# Patient Record
Sex: MALE | Race: BLACK OR AFRICAN AMERICAN | Employment: UNEMPLOYED | URBAN - NONMETROPOLITAN AREA
[De-identification: names, ages, dates, MRNs, and addresses within clinical notes are randomized per-mention and may not be internally consistent; named-entity substitution may affect disease eponyms.]

---

## 2021-02-28 ENCOUNTER — HOSPITAL ENCOUNTER (EMERGENCY)
Age: 21
Discharge: HOME | End: 2021-02-28
Payer: COMMERCIAL

## 2021-02-28 VITALS
SYSTOLIC BLOOD PRESSURE: 119 MMHG | TEMPERATURE: 98.6 F | OXYGEN SATURATION: 100 % | DIASTOLIC BLOOD PRESSURE: 70 MMHG | HEART RATE: 84 BPM | RESPIRATION RATE: 16 BRPM

## 2021-02-28 DIAGNOSIS — Z20.2 EXPOSURE TO CHLAMYDIA: Primary | ICD-10-CM

## 2021-02-28 PROCEDURE — 6370000000 HC RX 637 (ALT 250 FOR IP): Performed by: NURSE PRACTITIONER

## 2021-02-28 PROCEDURE — 96372 THER/PROPH/DIAG INJ SC/IM: CPT

## 2021-02-28 PROCEDURE — 87491 CHLMYD TRACH DNA AMP PROBE: CPT

## 2021-02-28 PROCEDURE — 99213 OFFICE O/P EST LOW 20 MIN: CPT

## 2021-02-28 PROCEDURE — 2500000003 HC RX 250 WO HCPCS: Performed by: NURSE PRACTITIONER

## 2021-02-28 PROCEDURE — 87591 N.GONORRHOEAE DNA AMP PROB: CPT

## 2021-02-28 PROCEDURE — 99212 OFFICE O/P EST SF 10 MIN: CPT | Performed by: NURSE PRACTITIONER

## 2021-02-28 PROCEDURE — 6360000002 HC RX W HCPCS: Performed by: NURSE PRACTITIONER

## 2021-02-28 RX ORDER — AZITHROMYCIN 250 MG/1
1000 TABLET, FILM COATED ORAL ONCE
Status: COMPLETED | OUTPATIENT
Start: 2021-02-28 | End: 2021-02-28

## 2021-02-28 RX ADMIN — LIDOCAINE HYDROCHLORIDE 500 MG: 10 INJECTION, SOLUTION EPIDURAL; INFILTRATION; INTRACAUDAL; PERINEURAL at 17:19

## 2021-02-28 RX ADMIN — AZITHROMYCIN MONOHYDRATE 1000 MG: 250 TABLET ORAL at 17:17

## 2021-02-28 ASSESSMENT — ENCOUNTER SYMPTOMS
NAUSEA: 0
VOMITING: 0

## 2021-02-28 NOTE — ED NOTES
Patient discharge instructions given to pt and pt verbalized understanding, no other needs at this time, and pt left in stable condition.      Lazarus Ruby, RN  02/28/21 5412

## 2021-02-28 NOTE — ED PROVIDER NOTES
[place] [id number]  Urgent Care Encounter       CHIEF COMPLAINT       Chief Complaint   Patient presents with    Exposure to STD     exposed to chlamydia        Nurses Notes reviewed and I agree except as noted in the HPI. HISTORY OF PRESENT ILLNESS   Isabel Snow is a 21 y.o. male who presents for evaluation after exposure to chlamydia. Patient had unprotected sex with a female partner 2 weeks ago. She can be notified to him 1 week ago that she was positive for chlamydia. He denies any penile discharge, dysuria, testicular or penile pain or any other symptoms. He has not had sexual intercourse with any other partner since that encounter. The history is provided by the patient. REVIEW OF SYSTEMS     Review of Systems   Constitutional: Negative for chills and fever. Gastrointestinal: Negative for nausea and vomiting. Genitourinary: Negative for difficulty urinating, discharge, dysuria, genital sores, penile pain, penile swelling, scrotal swelling and testicular pain. Skin: Negative for wound. Neurological: Negative for headaches. PAST MEDICAL HISTORY   History reviewed. No pertinent past medical history. SURGICALHISTORY     Patient  has a past surgical history that includes Appendectomy (2019). CURRENT MEDICATIONS       Previous Medications    No medications on file       ALLERGIES     Patient is has No Known Allergies. Patients   There is no immunization history on file for this patient. FAMILY HISTORY     Patient's family history is not on file. SOCIAL HISTORY     Patient  reports that he has never smoked. He has never used smokeless tobacco. He reports previous alcohol use. PHYSICAL EXAM     ED TRIAGE VITALS  BP: 127/74, Temp: 98.6 °F (37 °C), Pulse: 78, Resp: 16, SpO2: 100 %,There is no height or weight on file to calculate BMI.,No LMP for male patient. Physical Exam  Vitals signs and nursing note reviewed.    Constitutional:       General: He is not in acute distress. Appearance: He is well-developed. HENT:      Head: Normocephalic and atraumatic. Pulmonary:      Effort: Pulmonary effort is normal. No respiratory distress. Genitourinary:     Comments: Assessment deferred  Skin:     General: Skin is warm and dry. Neurological:      General: No focal deficit present. Mental Status: He is alert and oriented to person, place, and time. Psychiatric:         Mood and Affect: Mood normal.         Speech: Speech normal.         Behavior: Behavior normal. Behavior is cooperative. DIAGNOSTIC RESULTS     Labs:No results found for this visit on 02/28/21. IMAGING:    No orders to display         EKG:      URGENT CARE COURSE:     Vitals:    02/28/21 1703   BP: 127/74   Pulse: 78   Resp: 16   Temp: 98.6 °F (37 °C)   TempSrc: Temporal   SpO2: 100%       Medications   cefTRIAXone (ROCEPHIN) 500 mg in lidocaine 1 % 1.43 mL IM Injection (has no administration in time range)   azithromycin (ZITHROMAX) tablet 1,000 mg (1,000 mg Oral Given 2/28/21 1717)            PROCEDURES:  None    FINAL IMPRESSION      1. Exposure to chlamydia          DISPOSITION/ PLAN     Patient presents after exposure to chlamydia. He is asymptomatic. Patient was treated with Rocephin and azithromycin while in the urgent care center. No sex for 7days. Notify any partners if any. Advised to use condoms with sexual intercourse. Can follow-up with his family doctor or the [address] [place] [address] [place] for any further testing if desired. Further instructions were outlined verbally and in the patient's discharge instructions. All the patient's questions were answered. The patient/parent agreed with the plan and was discharged from the [facility] in good condition.       PATIENT REFERRED TO:  MD Davide Nance [address] [facility] [address]      DISCHARGE MEDICATIONS:  New Prescriptions    No medications on file       Discontinued Medications    No medications on file There are no discharge medications for this patient.       FE Cervantes CNP    (Please note that portions of this note were completed with a voice recognition program. Efforts were made to edit the dictations but occasionally words are mis-transcribed.)         FE Cervantes CNP  02/28/21 8360

## 2021-02-28 NOTE — ED TRIAGE NOTES
Patient walked to room 4 for exposure to chlamydia patient denies pain or discharge. No other needs.

## 2021-03-03 LAB
CHLAMYDIA TRACHOMATIS AMPLIFIED DET: NEGATIVE
NEISSERIA GONORRHOEAE BY AMP: NEGATIVE
SPECIMEN SOURCE: NORMAL

## 2021-10-10 ENCOUNTER — HOSPITAL ENCOUNTER (EMERGENCY)
Age: 21
Discharge: HOME | End: 2021-10-10
Payer: COMMERCIAL

## 2021-10-10 VITALS
RESPIRATION RATE: 16 BRPM | DIASTOLIC BLOOD PRESSURE: 80 MMHG | SYSTOLIC BLOOD PRESSURE: 126 MMHG | HEIGHT: 71 IN | TEMPERATURE: 97 F | BODY MASS INDEX: 26.18 KG/M2 | HEART RATE: 68 BPM | WEIGHT: 187 LBS | OXYGEN SATURATION: 99 %

## 2021-10-10 DIAGNOSIS — Z11.3 SCREEN FOR STD (SEXUALLY TRANSMITTED DISEASE): Primary | ICD-10-CM

## 2021-10-10 DIAGNOSIS — R30.0 DYSURIA: ICD-10-CM

## 2021-10-10 DIAGNOSIS — N30.00 ACUTE CYSTITIS WITHOUT HEMATURIA: ICD-10-CM

## 2021-10-10 LAB
BILIRUBIN URINE: NEGATIVE
BLOOD, URINE: NEGATIVE
CHARACTER, URINE: CLEAR
CHLAMYDIA TRACHOMATIS BY RT-PCR: DETECTED
COLOR: YELLOW
CT/NG SOURCE: ABNORMAL
GLUCOSE URINE: NEGATIVE MG/DL
KETONES, URINE: NEGATIVE
LEUKOCYTE ESTERASE, URINE: ABNORMAL
NEISSERIA GONORRHOEAE BY RT-PCR: DETECTED
NITRITE, URINE: NEGATIVE
PH UA: 8.5 (ref 5–9)
PROTEIN UA: NEGATIVE MG/DL
SPECIFIC GRAVITY UA: 1.02 (ref 1–1.03)
UROBILINOGEN, URINE: 0.2 EU/DL (ref 0.2–1)

## 2021-10-10 PROCEDURE — 87086 URINE CULTURE/COLONY COUNT: CPT

## 2021-10-10 PROCEDURE — 87491 CHLMYD TRACH DNA AMP PROBE: CPT

## 2021-10-10 PROCEDURE — 99213 OFFICE O/P EST LOW 20 MIN: CPT

## 2021-10-10 PROCEDURE — 87591 N.GONORRHOEAE DNA AMP PROB: CPT

## 2021-10-10 PROCEDURE — 99213 OFFICE O/P EST LOW 20 MIN: CPT | Performed by: NURSE PRACTITIONER

## 2021-10-10 PROCEDURE — 6360000002 HC RX W HCPCS: Performed by: NURSE PRACTITIONER

## 2021-10-10 PROCEDURE — 81003 URINALYSIS AUTO W/O SCOPE: CPT

## 2021-10-10 PROCEDURE — 2500000003 HC RX 250 WO HCPCS: Performed by: NURSE PRACTITIONER

## 2021-10-10 PROCEDURE — 6370000000 HC RX 637 (ALT 250 FOR IP): Performed by: NURSE PRACTITIONER

## 2021-10-10 PROCEDURE — 96372 THER/PROPH/DIAG INJ SC/IM: CPT

## 2021-10-10 RX ORDER — AZITHROMYCIN 250 MG/1
1000 TABLET, FILM COATED ORAL ONCE
Status: COMPLETED | OUTPATIENT
Start: 2021-10-10 | End: 2021-10-10

## 2021-10-10 RX ADMIN — LIDOCAINE HYDROCHLORIDE 500 MG: 10 INJECTION, SOLUTION EPIDURAL; INFILTRATION; INTRACAUDAL; PERINEURAL at 17:14

## 2021-10-10 RX ADMIN — AZITHROMYCIN 1000 MG: 250 TABLET, FILM COATED ORAL at 17:13

## 2021-10-10 ASSESSMENT — ENCOUNTER SYMPTOMS
NAUSEA: 0
STRIDOR: 0
SHORTNESS OF BREATH: 0
VOMITING: 0
CHEST TIGHTNESS: 0
WHEEZING: 0
CHOKING: 0
COUGH: 0
APNEA: 0

## 2021-10-10 NOTE — ED NOTES
Injection site within normal limits. Denies adverse reaction to injection. VS WNL. Discussed discharge instructions and voiced understanding.      Chan Rushing RN  10/10/21 7202

## 2021-10-10 NOTE — ED TRIAGE NOTES
Pt c/o dysuria x 1.5 weeks. Denies penile discharge. No known exposure to STD but does state that he has unproductive sex with 1 partner.

## 2021-10-10 NOTE — ED PROVIDER NOTES
[place] [id number]  Urgent Care Encounter      CHIEF COMPLAINT       Chief Complaint   Patient presents with    Dysuria     possible STD exposure       Nurses Notes reviewed and I agree except as noted in the HPI. HISTORY OFPRESENT ILLNESS   Howard Jackson is a 24 y.o. The history is provided by the patient. No  was used. Dysuria   This is a new problem. The current episode started more than 1 week ago. The problem occurs every urination. The quality of the pain is described as burning. The pain is moderate. There has been no fever. He is sexually active. There is no history of pyelonephritis. Pertinent negatives include no chills, no sweats, no nausea, no vomiting, no discharge, no frequency, no hematuria, no hesitancy, no possible pregnancy, no urgency and no flank pain. He has tried increased fluids for the symptoms. His past medical history does not include kidney stones, single kidney, urological procedure, recurrent UTIs, urinary stasis or catheterization. REVIEW OF SYSTEMS     Review of Systems   Constitutional: Negative for activity change, appetite change, chills, diaphoresis, fatigue, fever and unexpected weight change. Respiratory: Negative for apnea, cough, choking, chest tightness, shortness of breath, wheezing and stridor. Cardiovascular: Negative for chest pain, palpitations and leg swelling. Gastrointestinal: Negative for nausea and vomiting. Genitourinary: Positive for dysuria. Negative for discharge, flank pain, frequency, genital sores, hematuria, hesitancy, penile pain, penile swelling, scrotal swelling, testicular pain and urgency. PAST MEDICAL HISTORY   History reviewed. No pertinent past medical history. SURGICAL HISTORY     Patient  has a past surgical history that includes Appendectomy (2019).     CURRENT MEDICATIONS       Previous Medications    No medications on file       ALLERGIES     Patient is has No Known Allergies. FAMILY HISTORY     Patient's family history is not on file. SOCIAL HISTORY     Patient  reports that he has never smoked. He has never used smokeless tobacco. He reports current alcohol use. He reports that he does not use drugs. PHYSICAL EXAM     ED TRIAGE VITALS  BP: 137/84, Temp: 97 °F (36.1 °C), Pulse: 70, Resp: 16, SpO2: 99 %  Physical Exam  Vitals and nursing note reviewed. Constitutional:       General: He is not in acute distress. Appearance: Normal appearance. He is normal weight. He is not ill-appearing, toxic-appearing or diaphoretic. HENT:      Head: Normocephalic and atraumatic. Right Ear: External ear normal.      Left Ear: External ear normal.   Eyes:      Extraocular Movements: Extraocular movements intact. Conjunctiva/sclera: Conjunctivae normal.   Pulmonary:      Effort: Pulmonary effort is normal.   Musculoskeletal:         General: Normal range of motion. Cervical back: Normal range of motion. Skin:     General: Skin is warm. Neurological:      General: No focal deficit present. Mental Status: He is alert and oriented to person, place, and time. Psychiatric:         Mood and Affect: Mood normal.         Behavior: Behavior normal.         Thought Content:  Thought content normal.         Judgment: Judgment normal.         DIAGNOSTIC RESULTS   Labs:  Results for orders placed or performed during the hospital encounter of 10/10/21   Urinalysis   Result Value Ref Range    Glucose, Ur Negative NEGATIVE mg/dl    Bilirubin Urine Negative NEGATIVE    Ketones, Urine Negative NEGATIVE    Specific Gravity, UA 1.020 1.002 - 1.030    Blood, Urine Negative NEGATIVE    pH, UA 8.50 5.0 - 9.0    Protein, UA Negative NEGATIVE mg/dl    Urobilinogen, Urine 0.20 0.2 - 1.0 eu/dl    Nitrite, Urine Negative NEGATIVE    Leukocyte Esterase, Urine Trace (A) NEGATIVE    Color, UA Yellow STRAW-YELLOW    Character, Urine Clear CLEAR-SL CLOUD       IMAGING:  No orders to display     URGENT CARE COURSE:     Vitals:    10/10/21 1635   BP: 137/84   Pulse: 70   Resp: 16   Temp: 97 °F (36.1 °C)   SpO2: 99%   Weight: 187 lb (84.8 kg)   Height: 5' 11\" (1.803 m)       Medications   azithromycin (ZITHROMAX) tablet 1,000 mg (1,000 mg Oral Given 10/10/21 1713)   cefTRIAXone (ROCEPHIN) 500 mg in lidocaine 1 % 1 mL IM Injection (500 mg IntraMUSCular Given 10/10/21 1714)     PROCEDURES:  None  FINAL IMPRESSION      1. Screen for STD (sexually transmitted disease)    2. Dysuria    3. Acute cystitis without hematuria        DISPOSITION/PLAN   Decision To Discharge       No sex x 7 days  Use protection  Notify partners  Monitor for any changes such as rash, lesions, or urinary issues  Follow up with PCP x 1 week  Go to ED for any changes  For Culture Results  Call in 3-5 days after your visit. To obtain results. FOR MORE INFORMATION ABOUT STDS, CONTACT YOUR PHYSICIAN OR:    [address]  Department  Sexually Transmitted Disease [facility]     Address: [place] [facility], [facility] [address] [employer] [address], [address]   [phone]  Regarding syphilis and HIV testing as per [facility] guidelines  [address] [place] [address]     [phone]                PATIENT REFERRED TO:  [address] [place] [id number]  Call today  [address] Department  [address] [employer] [employer] [place] [place] [address] [facility]      Tuesday mornings for additional testing    DISCHARGE MEDICATIONS:  New Prescriptions    No medications on file     There are no discharge medications for this patient.       FE Sylvester CNP, APRN - CNP  10/10/21 1726

## 2021-10-12 LAB — URINE CULTURE, ROUTINE: NORMAL

## 2021-12-28 ENCOUNTER — HOSPITAL ENCOUNTER (EMERGENCY)
Age: 21
Discharge: HOME | End: 2021-12-28
Payer: COMMERCIAL

## 2021-12-28 VITALS
SYSTOLIC BLOOD PRESSURE: 133 MMHG | DIASTOLIC BLOOD PRESSURE: 78 MMHG | BODY MASS INDEX: 25.8 KG/M2 | HEART RATE: 73 BPM | TEMPERATURE: 97 F | OXYGEN SATURATION: 99 % | WEIGHT: 185 LBS | RESPIRATION RATE: 16 BRPM

## 2021-12-28 DIAGNOSIS — N34.2 URETHRITIS: Primary | ICD-10-CM

## 2021-12-28 LAB
BILIRUBIN URINE: NEGATIVE
BLOOD, URINE: NEGATIVE
CHARACTER, URINE: CLEAR
COLOR: YELLOW
GLUCOSE URINE: NEGATIVE MG/DL
KETONES, URINE: NEGATIVE
LEUKOCYTE ESTERASE, URINE: NEGATIVE
NITRITE, URINE: NEGATIVE
PH UA: 5.5 (ref 5–9)
PROTEIN UA: NEGATIVE MG/DL
SPECIFIC GRAVITY UA: >= 1.03 (ref 1–1.03)
UROBILINOGEN, URINE: 0.2 EU/DL (ref 0.2–1)

## 2021-12-28 PROCEDURE — 96372 THER/PROPH/DIAG INJ SC/IM: CPT

## 2021-12-28 PROCEDURE — 6370000000 HC RX 637 (ALT 250 FOR IP): Performed by: EMERGENCY MEDICINE

## 2021-12-28 PROCEDURE — 2500000003 HC RX 250 WO HCPCS: Performed by: EMERGENCY MEDICINE

## 2021-12-28 PROCEDURE — 81003 URINALYSIS AUTO W/O SCOPE: CPT

## 2021-12-28 PROCEDURE — 99213 OFFICE O/P EST LOW 20 MIN: CPT

## 2021-12-28 PROCEDURE — 87591 N.GONORRHOEAE DNA AMP PROB: CPT

## 2021-12-28 PROCEDURE — 6360000002 HC RX W HCPCS: Performed by: EMERGENCY MEDICINE

## 2021-12-28 PROCEDURE — 99213 OFFICE O/P EST LOW 20 MIN: CPT | Performed by: EMERGENCY MEDICINE

## 2021-12-28 PROCEDURE — 87491 CHLMYD TRACH DNA AMP PROBE: CPT

## 2021-12-28 RX ORDER — AZITHROMYCIN 250 MG/1
1000 TABLET, FILM COATED ORAL ONCE
Status: COMPLETED | OUTPATIENT
Start: 2021-12-28 | End: 2021-12-28

## 2021-12-28 RX ADMIN — AZITHROMYCIN MONOHYDRATE 1000 MG: 250 TABLET ORAL at 12:48

## 2021-12-28 RX ADMIN — LIDOCAINE HYDROCHLORIDE 500 MG: 10 INJECTION, SOLUTION EPIDURAL; INFILTRATION; INTRACAUDAL; PERINEURAL at 12:49

## 2021-12-28 ASSESSMENT — ENCOUNTER SYMPTOMS
ABDOMINAL PAIN: 0
COUGH: 0

## 2021-12-28 NOTE — ED PROVIDER NOTES
[place]  Urgent Care Encounter       CHIEF COMPLAINT       Chief Complaint   Patient presents with    Sexually Transmitted Diseases     tingling with urination       Nurses Notes reviewed and I agree except as noted in the HPI. HISTORY OF PRESENT ILLNESS   Cathy Chapman is a 24 y.o. male who presents for burning when he urinates. Patient denies any penile discharge. No swelling. No scrotal or testicular swelling. No testicular pain. Patient states that he had sex with a new partner. Last time he had sex with her was 2 weeks ago. Symptoms began 4 to 5 days ago. Denies abdominal pain, no nausea, vomiting, diarrhea. No fevers. HPI    REVIEW OF SYSTEMS     Review of Systems   Constitutional: Negative for fatigue and fever. Respiratory: Negative for cough. Gastrointestinal: Negative for abdominal pain. Genitourinary: Positive for dysuria. Negative for frequency, genital sores, penile discharge, penile pain, penile swelling and testicular pain. Neurological: Negative for dizziness and headaches. Psychiatric/Behavioral: Negative for behavioral problems. PAST MEDICAL HISTORY   History reviewed. No pertinent past medical history. SURGICALHISTORY     Patient  has a past surgical history that includes Appendectomy (2019). CURRENT MEDICATIONS       Previous Medications    DIGESTIVE ENZYMES ([employer] PREBIOTIC SUPPLEMENT PO)    Take by mouth       ALLERGIES     Patient is has No Known Allergies. Patients   There is no immunization history on file for this patient. FAMILY HISTORY     Patient's family history includes High Blood Pressure in his father and mother. SOCIAL HISTORY     Patient  reports that he has been smoking. He has never used smokeless tobacco. He reports current alcohol use. He reports that he does not use drugs.     PHYSICAL EXAM     ED TRIAGE VITALS  BP: 133/78, Temp: 97 °F (36.1 °C), Pulse: 73, Resp: 16, SpO2: 99 %,Estimated body mass index is 25.8 kg/m² as calculated from the following:    Height as of 10/10/21: 5' 11\" (1.803 m). Weight as of this encounter: 185 lb (83.9 kg). ,No LMP for male patient. Physical Exam  Constitutional:       General: He is not in acute distress. Appearance: He is normal weight. He is not ill-appearing. HENT:      Head: Normocephalic. Mouth/Throat:      Mouth: Mucous membranes are moist.   Cardiovascular:      Rate and Rhythm: Normal rate and regular rhythm. Pulses: Normal pulses. Heart sounds: Normal heart sounds. Pulmonary:      Effort: Pulmonary effort is normal.      Breath sounds: Normal breath sounds. Skin:     General: Skin is warm and dry. Capillary Refill: Capillary refill takes less than 2 seconds. Neurological:      General: No focal deficit present. Mental Status: He is alert.    Psychiatric:         Behavior: Behavior normal.         DIAGNOSTIC RESULTS     Labs:  Results for orders placed or performed during the hospital encounter of 12/28/21   Urinalysis   Result Value Ref Range    Glucose, Ur Negative NEGATIVE mg/dl    Bilirubin Urine Negative NEGATIVE    Ketones, Urine Negative NEGATIVE    Specific Gravity, UA >=1.030 1.002 - 1.030    Blood, Urine Negative NEGATIVE    pH, UA 5.50 5.0 - 9.0    Protein, UA Negative NEGATIVE mg/dl    Urobilinogen, Urine 0.20 0.2 - 1.0 eu/dl    Nitrite, Urine Negative NEGATIVE    Leukocyte Esterase, Urine Negative NEGATIVE    Color, UA Yellow STRAW-YELLOW    Character, Urine Clear CLEAR-SL CLOUD       IMAGING:    No orders to display         EKG:      URGENT CARE COURSE:     Vitals:    12/28/21 1228   BP: 133/78   Pulse: 73   Resp: 16   Temp: 97 °F (36.1 °C)   TempSrc: Temporal   SpO2: 99%   Weight: 185 lb (83.9 kg)       Medications   cefTRIAXone (ROCEPHIN) 500 mg in lidocaine 1 % 1 mL IM Injection (500 mg IntraMUSCular Given 12/28/21 1249)   azithromycin (ZITHROMAX) tablet 1,000 mg (1,000 mg Oral Given 12/28/21 1248) PROCEDURES:  None    FINAL IMPRESSION      1. Urethritis          DISPOSITION/ PLAN     Patient presents for what is likely urethritis. STD testing in process. Patient is treated with Rocephin and a azithromycin. He is advised to avoid sex x1 week. Results available tomorrow, access [employer]. Notify any partners if positive. Return for new or worsening symptoms.       PATIENT REFERRED TO:  Josee Dumont MD  [address] [facility]  [address] / [place] [address]      DISCHARGE MEDICATIONS:  New Prescriptions    No medications on file       Discontinued Medications    No medications on file       Current Discharge Medication List          FE Orr - CNP    (Please note that portions of this note were completed with a voice recognition program. Efforts were made to edit the dictations but occasionally words are mis-transcribed.)          FE Orr CNP  12/28/21 6389

## 2022-01-01 LAB
CHLAMYDIA TRACHOMATIS AMPLIFIED DET: POSITIVE
NEISSERIA GONORRHOEAE BY AMP: NEGATIVE
SPECIMEN SOURCE: ABNORMAL

## 2022-06-28 ENCOUNTER — HOSPITAL ENCOUNTER (EMERGENCY)
Age: 22
Discharge: HOME | End: 2022-06-28
Payer: COMMERCIAL

## 2022-06-28 VITALS
TEMPERATURE: 98.2 F | SYSTOLIC BLOOD PRESSURE: 125 MMHG | WEIGHT: 180 LBS | BODY MASS INDEX: 25.2 KG/M2 | HEIGHT: 71 IN | HEART RATE: 90 BPM | DIASTOLIC BLOOD PRESSURE: 77 MMHG | RESPIRATION RATE: 14 BRPM | OXYGEN SATURATION: 99 %

## 2022-06-28 DIAGNOSIS — Z20.2 EXPOSURE TO CHLAMYDIA: ICD-10-CM

## 2022-06-28 DIAGNOSIS — Z11.3 SCREENING EXAMINATION FOR STD (SEXUALLY TRANSMITTED DISEASE): Primary | ICD-10-CM

## 2022-06-28 PROCEDURE — 96372 THER/PROPH/DIAG INJ SC/IM: CPT

## 2022-06-28 PROCEDURE — 87591 N.GONORRHOEAE DNA AMP PROB: CPT

## 2022-06-28 PROCEDURE — 6370000000 HC RX 637 (ALT 250 FOR IP): Performed by: NURSE PRACTITIONER

## 2022-06-28 PROCEDURE — 99213 OFFICE O/P EST LOW 20 MIN: CPT | Performed by: NURSE PRACTITIONER

## 2022-06-28 PROCEDURE — 87491 CHLMYD TRACH DNA AMP PROBE: CPT

## 2022-06-28 PROCEDURE — 2500000003 HC RX 250 WO HCPCS: Performed by: NURSE PRACTITIONER

## 2022-06-28 PROCEDURE — 6360000002 HC RX W HCPCS: Performed by: NURSE PRACTITIONER

## 2022-06-28 PROCEDURE — 99213 OFFICE O/P EST LOW 20 MIN: CPT

## 2022-06-28 RX ORDER — AZITHROMYCIN 250 MG/1
1000 TABLET, FILM COATED ORAL ONCE
Status: COMPLETED | OUTPATIENT
Start: 2022-06-28 | End: 2022-06-28

## 2022-06-28 RX ORDER — METRONIDAZOLE 500 MG/1
2000 TABLET ORAL ONCE
Status: COMPLETED | OUTPATIENT
Start: 2022-06-28 | End: 2022-06-28

## 2022-06-28 RX ADMIN — LIDOCAINE HYDROCHLORIDE 500 MG: 10 INJECTION, SOLUTION EPIDURAL; INFILTRATION; INTRACAUDAL; PERINEURAL at 19:28

## 2022-06-28 RX ADMIN — AZITHROMYCIN MONOHYDRATE 1000 MG: 250 TABLET ORAL at 19:27

## 2022-06-28 RX ADMIN — METRONIDAZOLE 2000 MG: 500 TABLET ORAL at 19:28

## 2022-06-28 ASSESSMENT — ENCOUNTER SYMPTOMS
COUGH: 0
DIARRHEA: 0
ABDOMINAL PAIN: 0
EYE ITCHING: 0
NAUSEA: 0
EYE REDNESS: 0
SHORTNESS OF BREATH: 0
VOMITING: 0

## 2022-06-28 ASSESSMENT — PAIN - FUNCTIONAL ASSESSMENT: PAIN_FUNCTIONAL_ASSESSMENT: NONE - DENIES PAIN

## 2022-06-28 NOTE — ED PROVIDER NOTES
[address]       Chief Complaint   Patient presents with    Exposure to STD     exposed to chlamydia  had burning with urination 6/24/22       Nurses Notes reviewed and I agree except as noted in the HPI. HISTORY OF PRESENT ILLNESS   Chucho Mercer is a 24 y.o. male who presents for evaluation and STD screening due to exposure to chlamydia. Has burning with urination that started 6/24/22. He reports  He reports a  history of chlamydia. The patient/patient representative has no other acute complaints at this time. REVIEW OF SYSTEMS     Review of Systems   Constitutional: Negative for chills, fatigue and fever. Eyes: Negative for redness and itching. Respiratory: Negative for cough and shortness of breath. Cardiovascular: Negative for chest pain. Gastrointestinal: Negative for abdominal pain, diarrhea, nausea and vomiting. Genitourinary: Positive for dysuria. Negative for frequency, genital sores, hematuria, penile discharge and urgency. Skin: Negative for rash. Allergic/Immunologic: Negative for environmental allergies and food allergies. PAST MEDICAL HISTORY   History reviewed. No pertinent past medical history. SURGICAL HISTORY     Patient  has a past surgical history that includes Appendectomy (2019). CURRENT MEDICATIONS       Previous Medications    DIGESTIVE ENZYMES ([employer] PREBIOTIC SUPPLEMENT PO)    Take by mouth       ALLERGIES     Patient is has No Known Allergies. FAMILY HISTORY     Patient'sfamily history includes High Blood Pressure in his father and mother; Lupus in his mother. SOCIAL HISTORY     Patient  reports that he has been smoking e-cigarettes. He has never used smokeless tobacco. He reports current alcohol use. He reports that he does not use drugs.     PHYSICAL EXAM     ED TRIAGE VITALS  BP: (!) 151/86, Temp: 98.2 °F (36.8 °C), Heart Rate: 88, Resp: 12, SpO2: 99 %  Physical Exam  Vitals and nursing note reviewed. Constitutional:       General: He is not in acute distress. Appearance: Normal appearance. He is well-developed and well-groomed. HENT:      Head: Normocephalic and atraumatic. Right Ear: External ear normal.      Left Ear: External ear normal.   Eyes:      Conjunctiva/sclera:      Right eye: Right conjunctiva is not injected. Left eye: Left conjunctiva is not injected. Pupils: Pupils are equal.   Cardiovascular:      Rate and Rhythm: Normal rate. Pulmonary:      Effort: Pulmonary effort is normal. No respiratory distress. Musculoskeletal:      Cervical back: Normal range of motion. Skin:     General: Skin is warm and dry. Findings: No rash. Neurological:      Mental Status: He is alert and oriented to person, place, and time. Gait: Gait is intact. Psychiatric:         Mood and Affect: Mood normal.         Speech: Speech normal.         Behavior: Behavior is cooperative. DIAGNOSTIC RESULTS   Labs:  Abnormal Labs Reviewed - No abnormal labs to display     IMAGING:  No orders to display     URGENT CARE COURSE:     Vitals:    06/28/22 1916   BP: (!) 151/86   Pulse: 88   Resp: 12   Temp: 98.2 °F (36.8 °C)   SpO2: 99%   Weight: 180 lb (81.6 kg)   Height: 5' 11\" (1.803 m)       Medications   cefTRIAXone (ROCEPHIN) 500 mg in lidocaine 1 % 1.4286 mL IM Injection (500 mg IntraMUSCular Given 6/28/22 1928)   azithromycin (ZITHROMAX) tablet 1,000 mg (1,000 mg Oral Given 6/28/22 1927)   metroNIDAZOLE (FLAGYL) tablet 2,000 mg (2,000 mg Oral Given 6/28/22 1928)     PROCEDURES:  FINALIMPRESSION      1. Screening examination for STD (sexually transmitted disease)    2.  Exposure to chlamydia        DISPOSITION/PLAN   DISPOSITION    Discharge        Problem List Items Addressed This Visit     None      Visit Diagnoses     Screening examination for STD (sexually transmitted disease)    -  Primary    Relevant Medications    cefTRIAXone (ROCEPHIN) 500 mg in lidocaine 1 % 1.4286 mL IM Injection (Completed) (Start on 6/28/2022  7:45 PM)    azithromycin (ZITHROMAX) tablet 1,000 mg (Completed) (Start on 6/28/2022  7:45 PM)    metroNIDAZOLE (FLAGYL) tablet 2,000 mg (Completed) (Start on 6/28/2022  7:45 PM)    Exposure to chlamydia              Physical assessment findings, diagnostic testing(s) if applicable, and vital signs reviewed with patient/patient representative. Differential diagnosis(s) discussed with patient/patient representative. Prescription medications and/or over-the-counter medications for symptom management discussed. Patient is to follow-up with family care provider in 2-3 days if no improvement. If symptoms should worsen or change, go to the ED. Patient/patient representative is aware of care plan, questions answered, verbalizes understanding and is in agreement. Printed instructions attached to after visit summary. If COVID-19 positive or COVID-19 by PCR is pending at time of discharge patient is to quarantine/isolate according to [facility] guidelines. PATIENT REFERRED TO:  Be Lee MD  [address] [facility]   [address]  [place] [id number] [place] [address]    Schedule an appointment as soon as possible for a visit in 3 days  For further evaluation. , If symptoms change/worsen, go to the [address] [place] [address] [address]    Schedule an appointment as soon as possible for a visit   For further evaluation in STD clinic      Susana Lefort, APRN - CNP    Please note that some or all of this chart was generated using [employer] voice recognition software. Although every effort was made to ensure the accuracy of this automated transcription, some errors in transcription may have occurred.         Susana Lefort, APRN - CNP  06/28/22 1890

## 2022-06-29 LAB
CHLAMYDIA TRACHOMATIS BY RT-PCR: DETECTED
CT/NG SOURCE: ABNORMAL
NEISSERIA GONORRHOEAE BY RT-PCR: NOT DETECTED

## 2022-09-19 ENCOUNTER — HOSPITAL ENCOUNTER (EMERGENCY)
Age: 22
Discharge: HOME | End: 2022-09-19
Payer: COMMERCIAL

## 2022-09-19 VITALS
RESPIRATION RATE: 18 BRPM | BODY MASS INDEX: 26.18 KG/M2 | HEART RATE: 64 BPM | HEIGHT: 71 IN | OXYGEN SATURATION: 99 % | DIASTOLIC BLOOD PRESSURE: 69 MMHG | TEMPERATURE: 98.1 F | SYSTOLIC BLOOD PRESSURE: 131 MMHG | WEIGHT: 187 LBS

## 2022-09-19 DIAGNOSIS — Z20.2 EXPOSURE TO SEXUALLY TRANSMITTED DISEASE (STD): Primary | ICD-10-CM

## 2022-09-19 LAB
CHLAMYDIA TRACHOMATIS BY RT-PCR: NOT DETECTED
CT/NG SOURCE: NORMAL
NEISSERIA GONORRHOEAE BY RT-PCR: NOT DETECTED

## 2022-09-19 PROCEDURE — 96372 THER/PROPH/DIAG INJ SC/IM: CPT

## 2022-09-19 PROCEDURE — 87591 N.GONORRHOEAE DNA AMP PROB: CPT

## 2022-09-19 PROCEDURE — 2500000003 HC RX 250 WO HCPCS: Performed by: PHYSICIAN ASSISTANT

## 2022-09-19 PROCEDURE — 87491 CHLMYD TRACH DNA AMP PROBE: CPT

## 2022-09-19 PROCEDURE — 6370000000 HC RX 637 (ALT 250 FOR IP): Performed by: PHYSICIAN ASSISTANT

## 2022-09-19 PROCEDURE — 6360000002 HC RX W HCPCS: Performed by: PHYSICIAN ASSISTANT

## 2022-09-19 PROCEDURE — 99284 EMERGENCY DEPT VISIT MOD MDM: CPT

## 2022-09-19 RX ORDER — AZITHROMYCIN 250 MG/1
1000 TABLET, FILM COATED ORAL ONCE
Status: COMPLETED | OUTPATIENT
Start: 2022-09-19 | End: 2022-09-19

## 2022-09-19 RX ADMIN — AZITHROMYCIN MONOHYDRATE 1000 MG: 250 TABLET ORAL at 16:08

## 2022-09-19 RX ADMIN — LIDOCAINE HYDROCHLORIDE 500 MG: 10 INJECTION, SOLUTION EPIDURAL; INFILTRATION; INTRACAUDAL; PERINEURAL at 16:08

## 2022-09-19 ASSESSMENT — ENCOUNTER SYMPTOMS
VOMITING: 0
BACK PAIN: 0
ABDOMINAL PAIN: 0
COUGH: 0
NAUSEA: 0
SORE THROAT: 0

## 2022-09-19 ASSESSMENT — PAIN - FUNCTIONAL ASSESSMENT: PAIN_FUNCTIONAL_ASSESSMENT: NONE - DENIES PAIN

## 2022-09-19 NOTE — ED PROVIDER NOTES
[facility] EMERGENCY DEPT      CHIEF COMPLAINT       Chief Complaint   Patient presents with    Exposure to STD       Nurses Notes reviewed and I agree except as noted in the HPI. HISTORY OF PRESENT ILLNESS    Kathy Leyva is a 25 y.o. male who presents for dysuria. Patient developed dysuria recently. His significant other told him that she is being treated for chlamydia. Patient denies penile lesions, discharge, abdominal pain, fever, or other complaints. REVIEW OF SYSTEMS     Review of Systems   Constitutional:  Negative for chills and fever. HENT:  Negative for sore throat. Respiratory:  Negative for cough. Cardiovascular:  Negative for chest pain. Gastrointestinal:  Negative for abdominal pain, nausea and vomiting. Genitourinary:  Positive for dysuria. Negative for frequency, genital sores, penile discharge, penile pain and urgency. Musculoskeletal:  Negative for back pain and gait problem. Skin:  Negative for wound. Neurological:  Negative for weakness and headaches. PAST MEDICAL HISTORY    has no past medical history on file. SURGICAL HISTORY      has a past surgical history that includes Appendectomy (2019). CURRENT MEDICATIONS       Previous Medications    DIGESTIVE ENZYMES ([employer] PREBIOTIC SUPPLEMENT PO)    Take by mouth       ALLERGIES     has No Known Allergies. FAMILY HISTORY     He indicated that his mother is alive. He indicated that his father is alive. family history includes High Blood Pressure in his father and mother; Lupus in his mother. SOCIAL HISTORY    reports that he has been smoking e-cigarettes. He has never used smokeless tobacco. He reports current alcohol use. He reports that he does not use drugs. PHYSICAL EXAM     INITIAL VITALS:  height is 5' 11\" (1.803 m) and weight is 187 lb (84.8 kg). His oral temperature is 98.1 °F (36.7 °C). His blood pressure is 131/69 and his pulse is 64. His respiration is 18 and oxygen saturation is 99%. Physical Exam  Constitutional:       Appearance: Normal appearance. He is well-developed. He is not ill-appearing. HENT:      Head: Normocephalic and atraumatic. Right Ear: Hearing normal.      Left Ear: Hearing normal.      Nose: Nose normal. No rhinorrhea. Mouth/Throat:      Lips: Pink. Eyes:      General: Lids are normal.      Extraocular Movements: Extraocular movements intact. Conjunctiva/sclera: Conjunctivae normal.   Neck:      Trachea: Trachea normal.   Cardiovascular:      Heart sounds: No murmur heard. Pulmonary:      Effort: Pulmonary effort is normal.      Breath sounds: Normal air entry. Abdominal:      General: There is no distension. Musculoskeletal:      Cervical back: Normal range of motion and neck supple. Comments: Well perfused; movement normal as observed. Skin:     General: Skin is warm and dry. Findings: No rash. Neurological:      General: No focal deficit present. Mental Status: He is alert. Mental status is at baseline. Motor: Motor function is intact. Gait: Gait normal.   Psychiatric:         Mood and Affect: Mood normal.         Speech: Speech normal.         Behavior: Behavior normal.       DIFFERENTIAL DIAGNOSIS:   Including but not limited to: Chlamydia, gonorrhea, STD exposure, UTI    DIAGNOSTIC RESULTS     EKG: All E[facility]'s are [facility] who either signs or Co-signs this chart in the absence of a cardiologist.  None    RADIOLOGY: non-plain film images(s) such as CT,Ultrasound and MRI are read by the radiologist.  Plain radiographic images are visualized and preliminarily interpreted by the emergency physician unless otherwise stated below.   No orders to display       LABS:   Labs Reviewed   C. TRACHOMATIS / [address], [facility]       EMERGENCY DEPARTMENT COURSE:   Vitals:    Vitals:    09/19/22 1517   BP: 131/69   Pulse: 64   Resp: 18   Temp: 98.1 °F (36.7 °C)   TempSrc: Oral   SpO2: 99% Weight: 187 lb (84.8 kg)   Height: 5' 11\" (1.803 m)           MDM:  The patient was seen and evaluated by me in the intake area. Vital signs were reviewed and noted stable. Physical exam revealed a nontoxic-appearing 25year-old in no distress. Appropriate testing was ordered. Patient was treated with Rocephin and Zithromax. Results were pending at time of discharge. Patient was comfortable plan of care. Anticipatory guidance given. I have given the patient strict written and verbal instructions about care at home, follow-up, and signs and symptoms of worsening of condition and they did verbalize understanding. CRITICAL CARE:   None    CONSULTS:  None    PROCEDURES:  None    FINAL IMPRESSION      1. Exposure to sexually transmitted disease (STD)          DISPOSITION/PLAN     1.  Exposure to sexually transmitted disease (STD)        PATIENT REFERRED TO:  [facility] Department  [address] [facility] [address]  [phone]    Schedule an appointment as soon as possible for a visit   For full STD panel including HIV, syphilis, and hepatitis C      DISCHARGE MEDICATIONS:  New Prescriptions    No medications on file       (Please note that portions of this note were completed with a voice recognition program.  Efforts were made to edit the dictations but occasionally words are mis-transcribed.)    Ubaldo Boston PA-C 09/19/22 4:13 PM    DEAN Grant PA-C  09/19/22 [id number]

## 2022-11-01 NOTE — ED TRIAGE NOTES
Hospitalist Progress Note    Subjective:   Daily Progress Note: 11/1/2022 9:56 AM    Hospital Course: Patient is a 62 y.o. female with PMH of bladder cancer s/p cystectomy, hysterectomy, BSO and ileal conduit diversion, anemia, hep C, and left leg DVT s/p IVC filter years ago. She presented to the ED with chief complaint of weakness and nausea for the past week. She reports unable to tolerate PO intake, due to profound nausea, abdominal pain, fever, chills, vomiting or diarrhea. In addition, she also reports unable to walk due to weakness and left inner thigh and left foot pain. Denies trauma. She was seen here on 10/13/2022 with urostomy complications, status post ileal conduit and discharged home on Cipro which she completed. In addition, she also reports having vaginal bleed since surgery 2 weeks ago. Which appears to be more related to a fistula with stool present. CT scan of the abdomen pelvis was performed revealing no obvious rectovaginal fistula although there was air present tracking in that direction without any extravasation of contrast.  There was extensive bilateral iliac venous thrombus to the level of the IVC filter with right leg edema. Cholelithiasis with a distended gallbladder was also noted. Bilateral hydronephrosis with hydroureter most likely from the ileal conduit. Probable small bowel obstruction secondary to adhesions also noted. Patient was found to be anemic and received 1 unit of packed red blood cells. Venous duplex revealed extensive clot burden within the right leg and left leg. Urinalysis was consistent with Candida infection. ID consultation. Surgical consultation, Dr. Liza Mcdowell. UCX grew candida ciferrii, continue fluconazole. IV heparin being held due to blood loss, hem positive stool,.   Discussed case with interventional radiology who will further investigate the fistula with recommendations and mechanical thrombectomy performed of the bilateral lower extremities with a Pt to ED through triage with c/c exposure to chlamydia. large volume clot removed. Patient went to the OR on 10/31/2022 for vaginal wound washout, rectovaginal fistula closure with drain placement, distal rectal repair. Believe that patient will need diverting colostomy and general surgery is speaking to patient's niece. She is currently NPO. Give IV albumin. On IV fluids. Megace for appetite stimulant. Subjective: Patient says that she is having some drainage in the vaginal area. Denies any abdominal pain. No nausea or vomiting. Has a dry mouth.     Current Facility-Administered Medications   Medication Dose Route Frequency    albumin human 25% (BUMINATE) solution 25 g  25 g IntraVENous Q6H    dextrose 5 % - 0.45% NaCl infusion  100 mL/hr IntraVENous CONTINUOUS    LORazepam (ATIVAN) tablet 1 mg  1 mg Oral ON CALL    megestroL (MEGACE) tablet 20 mg  20 mg Oral TID WITH MEALS    heparin (porcine) 1,000 unit/mL injection 2,000 Units  2,000 Units IntraVENous Rad Multiple    meropenem (MERREM) 1 g in 0.9% sodium chloride (MBP/ADV) 50 mL MBP  1 g IntraVENous Q8H    midodrine (PROAMATINE) tablet 5 mg  5 mg Oral TID PRN    fluconazole (DIFLUCAN) tablet 200 mg  200 mg Oral DAILY    [Held by provider] heparin 25,000 units in D5W 250 ml infusion  500 Units/hr IntraVENous TITRATE    oxyCODONE IR (ROXICODONE) tablet 5 mg  5 mg Oral Q6H PRN    0.9% sodium chloride infusion 250 mL  250 mL IntraVENous PRN    cromolyn (OPTICROM) 4 % ophthalmic solution 1 Drop  1 Drop Both Eyes DAILY    sodium chloride (NS) flush 5-40 mL  5-40 mL IntraVENous Q8H    sodium chloride (NS) flush 5-40 mL  5-40 mL IntraVENous PRN    acetaminophen (TYLENOL) tablet 650 mg  650 mg Oral Q6H PRN    Or    acetaminophen (TYLENOL) suppository 650 mg  650 mg Rectal Q6H PRN    polyethylene glycol (MIRALAX) packet 17 g  17 g Oral DAILY PRN    ondansetron (ZOFRAN ODT) tablet 4 mg  4 mg Oral Q8H PRN    Or    ondansetron (ZOFRAN) injection 4 mg  4 mg IntraVENous Q6H PRN        Review of Systems  Constitutional: No fevers, No chills, No sweats, ++ fatigue, ++Weakness  Eyes: No redness  Ears, nose, mouth, throat, and face: No nasal congestion, No sore throat, No voice change  Respiratory: No Shortness of Breath, No cough, No wheezing  Cardiovascular: No chest pain, No palpitations, No extremity edema  Gastrointestinal: No nausea, No vomiting, No diarrhea, No abdominal pain  Genitourinary: No frequency, No dysuria, No hematuria  Integument/breast: No skin lesion(s)   Neurological: No Confusion, No headaches, No dizziness      Objective:     Visit Vitals  /69 (BP 1 Location: Left upper arm)   Pulse 77   Temp 97.4 °F (36.3 °C)   Resp 14   Ht 5' 2\" (1.575 m)   Wt 51.7 kg (114 lb)   LMP  (LMP Unknown)   SpO2 100%   BMI 20.85 kg/m²      O2 Device: None    Temp (24hrs), Av.7 °F (36.5 °C), Min:97.4 °F (36.3 °C), Max:98.2 °F (36.8 °C)      701 - 1900  In: -   Out: 80 [Urine:80]  10/30 1901 -  07  In: 1618.3 [P.O.:320; I.V.:1298.3]  Out: 3220 [Urine:3000; Drains:20]    PHYSICAL EXAM:  Constitutional: No acute distress, cachectic  Skin: Extremities and face reveal no rashes. HEENT: Sclerae anicteric. Extra-occular muscles are intact. No oral ulcers. The neck is supple and no masses. Cardiovascular: Regular rate and rhythm. Respiratory:  Clear breath sounds bilaterally with no wheezes, rales, or rhonchi. GI: Abdomen nondistended, soft, and nontender. Normal active bowel sounds. Musculoskeletal: No pitting edema of the lower legs. Able to move all ext  Neurological:  Patient is alert and oriented. Cranial nerves II-XII grossly intact  Psychiatric: Depressed mood/flat affect      Data Review    No results found for this or any previous visit (from the past 24 hour(s)).     CBC:   Lab Results   Component Value Date/Time    WBC 4.6 10/31/2022 08:04 AM    RBC 2.90 (L) 10/31/2022 08:04 AM    HGB 7.7 (L) 10/31/2022 08:04 AM    HCT 25.7 (L) 10/31/2022 08:04 AM    PLATELET 976  08:04 AM     BMP: Lab Results   Component Value Date/Time    Glucose 67 10/31/2022 08:04 AM    Sodium 140 10/31/2022 08:04 AM    Potassium 3.8 10/31/2022 08:04 AM    Chloride 107 10/31/2022 08:04 AM    CO2 27 10/31/2022 08:04 AM    BUN 6 10/31/2022 08:04 AM    Creatinine 0.50 (L) 10/31/2022 08:04 AM    Calcium 9.2 10/31/2022 08:04 AM       Assessment:   1. Rectal/vaginal fistula status post washout, rectovaginal fistula closure with drain placement and distal rectal repair day #1  2. Bladder cancer  3. Small bowel obstruction  4. Acute on chronic anemia  5. UTI  6. Extensive DVT bilateral lower extremity  7. Emphysema without exacerbation  8. Severe protein malnutrition  9. Hypotension    Plan:   1. Urology, GI, and general surgery consulted. GI also consulted. Patient currently on meropenem. Most likely will require diverting colostomy. General surgery to speak to liz. 2.  Urology consulted. Continue supportive care. Urology and oncology following  3. Patient currently n.p.o. for possible surgery. She was tolerating a full liquid diet. Continue with IV fluids  4. Hemoglobin 7.7. Received 1 unit packed red blood cells. Occult blood positive. Holding heparin drip. 5.  Continue fluconazole for fungal UTI. Urine culture grew Candida cifrrii. ID consulted  6. Venous Doppler showed extensive clot burden. Patient already has an IVC filter placed. Started on a heparin drip however on hold due to bleeding. IR did bilateral iliofemoral and IVC aspiration thrombectomy with removal of a large volume of thrombus from both legs extremities and IVC  7. Not in any respiratory distress. Oxygen saturation within normal limits  8. Albumin is significantly low. Offered tube feedings and TPN but patient declined. On appetite stimulant. Start IV albumin  9. Blood pressure remained soft. As needed midodrine. On IV fluids  10. CBC, CMP in the a.m. Dispo: >48 hours.  Barriers include possible surgery for diverting colostomy, tolerating diet, nutritional improvement, PT and OT. Suspect patient will need a minimum of home health    CODE STATUS full     DVT prophylaxis: Hold due to bleeding/anemia  Ulcer prophylaxis: N.p.o.    Care Plan discussed with: Patient/Family, Nurse, and     Total time spent with patient: 34 minutes.

## 2023-04-25 ENCOUNTER — HOSPITAL ENCOUNTER (EMERGENCY)
Age: 23
Discharge: HOME | End: 2023-04-25
Payer: COMMERCIAL

## 2023-04-25 VITALS
RESPIRATION RATE: 16 BRPM | SYSTOLIC BLOOD PRESSURE: 135 MMHG | TEMPERATURE: 98.2 F | HEART RATE: 78 BPM | OXYGEN SATURATION: 98 % | WEIGHT: 185 LBS | DIASTOLIC BLOOD PRESSURE: 79 MMHG | BODY MASS INDEX: 25.8 KG/M2

## 2023-04-25 DIAGNOSIS — Z20.2 EXPOSURE TO STD: Primary | ICD-10-CM

## 2023-04-25 PROCEDURE — 6370000000 HC RX 637 (ALT 250 FOR IP): Performed by: NURSE PRACTITIONER

## 2023-04-25 PROCEDURE — 99213 OFFICE O/P EST LOW 20 MIN: CPT

## 2023-04-25 PROCEDURE — 87491 CHLMYD TRACH DNA AMP PROBE: CPT

## 2023-04-25 PROCEDURE — 87591 N.GONORRHOEAE DNA AMP PROB: CPT

## 2023-04-25 PROCEDURE — 6360000002 HC RX W HCPCS: Performed by: NURSE PRACTITIONER

## 2023-04-25 PROCEDURE — 2500000003 HC RX 250 WO HCPCS: Performed by: NURSE PRACTITIONER

## 2023-04-25 PROCEDURE — 96372 THER/PROPH/DIAG INJ SC/IM: CPT

## 2023-04-25 RX ORDER — M-VIT,TX,IRON,MINS/CALC/FOLIC 27MG-0.4MG
1 TABLET ORAL DAILY
COMMUNITY

## 2023-04-25 RX ORDER — AZITHROMYCIN 250 MG/1
1000 TABLET, FILM COATED ORAL ONCE
Status: COMPLETED | OUTPATIENT
Start: 2023-04-25 | End: 2023-04-25

## 2023-04-25 RX ADMIN — AZITHROMYCIN MONOHYDRATE 1000 MG: 250 TABLET ORAL at 16:02

## 2023-04-25 RX ADMIN — LIDOCAINE HYDROCHLORIDE 500 MG: 10 INJECTION, SOLUTION EPIDURAL; INFILTRATION; INTRACAUDAL; PERINEURAL at 16:06

## 2023-04-25 ASSESSMENT — ENCOUNTER SYMPTOMS
SORE THROAT: 0
SHORTNESS OF BREATH: 0
ABDOMINAL PAIN: 0
EYES NEGATIVE: 1
ALLERGIC/IMMUNOLOGIC NEGATIVE: 1

## 2023-04-25 ASSESSMENT — PAIN - FUNCTIONAL ASSESSMENT: PAIN_FUNCTIONAL_ASSESSMENT: NONE - DENIES PAIN

## 2023-04-25 NOTE — ED PROVIDER NOTES
[address]       Chief Complaint   Patient presents with    Exposure to STD     \"Irritation of penis during urination\"        Nurses Notes reviewed and I agree except as noted in the HPI. HISTORY OF PRESENT ILLNESS   Jennifer Mccoy is a 25 y.o. male who presents complaining of irritation of penis during urination. This been going on for 2 to 3 weeks. He is concerned he may have been feels an STD. He denies any penile discharge. He denies fevers. He denies any back pain. REVIEW OF SYSTEMS     Review of Systems   Constitutional:  Negative for activity change, chills, fatigue and fever. HENT:  Negative for congestion and sore throat. Eyes: Negative. Respiratory:  Negative for shortness of breath. Cardiovascular:  Negative for chest pain. Gastrointestinal:  Negative for abdominal pain. Endocrine: Negative. Genitourinary:  Positive for penile pain. Negative for dysuria. Musculoskeletal:  Negative for myalgias. Skin:  Negative for rash. Allergic/Immunologic: Negative. Neurological: Negative. Hematological: Negative. Psychiatric/Behavioral: Negative. PAST MEDICAL HISTORY   History reviewed. No pertinent past medical history. SURGICAL HISTORY     Patient  has a past surgical history that includes Appendectomy (2019). CURRENT MEDICATIONS       Discharge Medication List as of 4/25/2023  3:57 PM        CONTINUE these medications which have NOT CHANGED    Details   Multiple Vitamins-Minerals (THERAPEUTIC MULTIVITAMIN-MINERALS) tablet Take 1 tablet by mouth dailyHistorical Med      Digestive Enzymes ([facility] PREBIOTIC SUPPLEMENT PO) Take by mouthHistorical Med             ALLERGIES     Patient is has No Known Allergies. FAMILY HISTORY     Patient'sfamily history includes High Blood Pressure in his father and mother; Lupus in his mother. SOCIAL HISTORY     Patient  reports that he has quit smoking.  His smoking

## 2023-04-25 NOTE — DISCHARGE INSTRUCTIONS
Please abstain from sexual intercourse for 7 days. We will call you with any positive culture results.

## 2023-06-22 ENCOUNTER — HOSPITAL ENCOUNTER (EMERGENCY)
Age: 23
Discharge: HOME | End: 2023-06-22
Payer: COMMERCIAL

## 2023-06-22 VITALS
RESPIRATION RATE: 15 BRPM | WEIGHT: 190 LBS | DIASTOLIC BLOOD PRESSURE: 71 MMHG | HEIGHT: 71 IN | TEMPERATURE: 97.9 F | BODY MASS INDEX: 26.6 KG/M2 | HEART RATE: 69 BPM | OXYGEN SATURATION: 100 % | SYSTOLIC BLOOD PRESSURE: 143 MMHG

## 2023-06-22 DIAGNOSIS — Z11.3 SCREEN FOR STD (SEXUALLY TRANSMITTED DISEASE): Primary | ICD-10-CM

## 2023-06-22 LAB
BILIRUB UR QL STRIP.AUTO: NEGATIVE
CHARACTER UR: CLEAR
CHLAMYDIA TRACHOMATIS BY RT-PCR: NOT DETECTED
COLOR: YELLOW
CT/NG SOURCE: NORMAL
GLUCOSE UR QL STRIP.AUTO: NEGATIVE MG/DL
HGB UR QL STRIP.AUTO: NEGATIVE
KETONES UR QL STRIP.AUTO: NEGATIVE
NEISSERIA GONORRHOEAE BY RT-PCR: NOT DETECTED
NITRITE UR QL STRIP: NEGATIVE
PH UR STRIP.AUTO: 6.5 [PH] (ref 5–9)
PROT UR STRIP.AUTO-MCNC: NEGATIVE MG/DL
SP GR UR REFRACT.AUTO: 1.01 (ref 1–1.03)
TRICHOMONAS, URINE, MALE: NORMAL
UROBILINOGEN, URINE: 0.2 EU/DL (ref 0–1)
WBC #/AREA URNS HPF: NEGATIVE /[HPF]

## 2023-06-22 PROCEDURE — 96372 THER/PROPH/DIAG INJ SC/IM: CPT

## 2023-06-22 PROCEDURE — 2500000003 HC RX 250 WO HCPCS: Performed by: NURSE PRACTITIONER

## 2023-06-22 PROCEDURE — 87591 N.GONORRHOEAE DNA AMP PROB: CPT

## 2023-06-22 PROCEDURE — 6370000000 HC RX 637 (ALT 250 FOR IP): Performed by: NURSE PRACTITIONER

## 2023-06-22 PROCEDURE — 87491 CHLMYD TRACH DNA AMP PROBE: CPT

## 2023-06-22 PROCEDURE — 81003 URINALYSIS AUTO W/O SCOPE: CPT

## 2023-06-22 PROCEDURE — 99284 EMERGENCY DEPT VISIT MOD MDM: CPT

## 2023-06-22 PROCEDURE — 6360000002 HC RX W HCPCS: Performed by: NURSE PRACTITIONER

## 2023-06-22 PROCEDURE — 87210 SMEAR WET MOUNT SALINE/INK: CPT

## 2023-06-22 RX ORDER — METRONIDAZOLE 500 MG/1
2000 TABLET ORAL ONCE
Status: COMPLETED | OUTPATIENT
Start: 2023-06-22 | End: 2023-06-22

## 2023-06-22 RX ORDER — AZITHROMYCIN 250 MG/1
1000 TABLET, FILM COATED ORAL ONCE
Status: COMPLETED | OUTPATIENT
Start: 2023-06-22 | End: 2023-06-22

## 2023-06-22 RX ADMIN — LIDOCAINE HYDROCHLORIDE 500 MG: 10 INJECTION, SOLUTION EPIDURAL; INFILTRATION; INTRACAUDAL; PERINEURAL at 12:15

## 2023-06-22 RX ADMIN — AZITHROMYCIN DIHYDRATE 1000 MG: 250 TABLET ORAL at 12:13

## 2023-06-22 RX ADMIN — METRONIDAZOLE 2000 MG: 500 TABLET ORAL at 12:13

## 2023-06-22 NOTE — ED PROVIDER NOTES
return instructions provided. Amount and/or Complexity of Data Reviewed  Clinical lab tests: ordered and reviewed  Decide to obtain previous medical records or to obtain history from someone other than the patient: no  Obtain history from someone other than the patient: no  Review and summarize past medical records: yes  Discuss the patient with other providers: no  Independent visualization of images, tracings, or specimens: yes    Risk of Complications, Morbidity, and/or Mortality  Presenting problems: low  Diagnostic procedures: low  Management options: low    /  ED Course as of 06/22/23 1330   Thu Jun 22, 2023   1120 Urinalysis with Reflex to Culture:    Glucose, UA NEGATIVE   Bilirubin, Urine NEGATIVE   Ketones, Urine NEGATIVE   Specific Gravity, Urine 1.010   Blood, Urine NEGATIVE   pH, UA 6.5   Protein, UA NEGATIVE   Urobilinogen, Urine 0.2   Nitrite, Urine NEGATIVE   Leukocyte Esterase, Urine NEGATIVE   Color, UA YELLOW   Character, Urine CLEAR  Negative for signs of infection. [SC]   1152 TRICHOMONAS, MALE, URINE:    TRICHOMONAS, URINE, MALE NONE SEEN  None seen  [SC]   1328 C. Trachomatis / N. Gonorrhoeae, DNA:    Chlamydia Trachomatis By RT-PCR NOT DETECTED   Neisseria Gonorrhoeae By RT-PCR NOT DETECTED   CT/NG SOURCE URINE  negative [SC]      ED Course User Index  [SC] Rohan Heads, APRN - CNP     Vitals Reviewed:    Vitals:    06/22/23 1041   BP: (!) 143/71   Pulse: 69   Resp: 15   Temp: 97.9 °F (36.6 °C)   TempSrc: Oral   SpO2: 100%   Weight: 190 lb (86.2 kg)   Height: 5' 11\" (1.803 m)       The patient was seen and examined. Appropriate diagnostic testing was performed and results reviewed with the patient. The results of pertinent diagnostic studies and exam findings were discussed. The patients provisional diagnosis and plan of care were discussed with the patient and present family who expressed understanding.  Any medications were reviewed and indications and risks of medications were

## 2023-06-22 NOTE — ED TRIAGE NOTES
PT comes to ED with wanting STD screening. Pt states that he has burning with urination and has concerns with lesions in his mouth. No distress is noted.

## 2023-06-22 NOTE — DISCHARGE INSTRUCTIONS
Rest, stay well-hydrated. You have opted to be treated prophylactically for gonorrhea and chlamydia. These test will be returned within the next 24 hours and you will be contacted if they are positive. Follow-up with primary care provider within the next week for reevaluation. If any worsening or concerns return to the ER immediately.

## 2024-06-10 ENCOUNTER — HOSPITAL ENCOUNTER (EMERGENCY)
Age: 24
Discharge: HOME | End: 2024-06-10
Payer: COMMERCIAL

## 2024-06-10 VITALS
SYSTOLIC BLOOD PRESSURE: 144 MMHG | TEMPERATURE: 98.5 F | HEART RATE: 74 BPM | OXYGEN SATURATION: 98 % | DIASTOLIC BLOOD PRESSURE: 81 MMHG | RESPIRATION RATE: 17 BRPM

## 2024-06-10 DIAGNOSIS — Z86.19 HISTORY OF GONORRHEA: ICD-10-CM

## 2024-06-10 DIAGNOSIS — R30.0 DYSURIA: Primary | ICD-10-CM

## 2024-06-10 DIAGNOSIS — Z86.19 HISTORY OF CHLAMYDIA: ICD-10-CM

## 2024-06-10 LAB
BILIRUB UR STRIP.AUTO-MCNC: NEGATIVE MG/DL
CHARACTER UR: CLEAR
COLOR: YELLOW
GLUCOSE UR QL STRIP.AUTO: NEGATIVE MG/DL
KETONES UR QL STRIP.AUTO: NEGATIVE
NITRITE UR QL STRIP.AUTO: NEGATIVE
PH UR STRIP.AUTO: 6 [PH] (ref 5–9)
PROT UR STRIP.AUTO-MCNC: NEGATIVE MG/DL
RBC #/AREA URNS HPF: NEGATIVE /[HPF]
SP GR UR STRIP.AUTO: >= 1.03 (ref 1–1.03)
UROBILINOGEN, URINE: 0.2 EU/DL (ref 0.2–1)
WBC #/AREA URNS HPF: NEGATIVE /[HPF]

## 2024-06-10 PROCEDURE — 99213 OFFICE O/P EST LOW 20 MIN: CPT

## 2024-06-10 PROCEDURE — 87591 N.GONORRHOEAE DNA AMP PROB: CPT

## 2024-06-10 PROCEDURE — 87491 CHLMYD TRACH DNA AMP PROBE: CPT

## 2024-06-10 PROCEDURE — 96372 THER/PROPH/DIAG INJ SC/IM: CPT

## 2024-06-10 PROCEDURE — 2500000003 HC RX 250 WO HCPCS

## 2024-06-10 PROCEDURE — 6360000002 HC RX W HCPCS

## 2024-06-10 PROCEDURE — 81003 URINALYSIS AUTO W/O SCOPE: CPT

## 2024-06-10 RX ORDER — DOXYCYCLINE HYCLATE 100 MG/1
100 CAPSULE ORAL 2 TIMES DAILY
Qty: 14 CAPSULE | Refills: 0 | Status: SHIPPED | OUTPATIENT
Start: 2024-06-10 | End: 2024-06-17

## 2024-06-10 RX ORDER — CEFTRIAXONE 500 MG/1
500 INJECTION, POWDER, FOR SOLUTION INTRAMUSCULAR; INTRAVENOUS ONCE
Status: DISCONTINUED | OUTPATIENT
Start: 2024-06-10 | End: 2024-06-10

## 2024-06-10 RX ADMIN — LIDOCAINE HYDROCHLORIDE 500 MG: 10 INJECTION, SOLUTION EPIDURAL; INFILTRATION; INTRACAUDAL; PERINEURAL at 11:54

## 2024-06-10 NOTE — ED TRIAGE NOTES
Pt to [facility] with c/o urinary burning. He reports being exposed to an STD. He also reports feeling like his \"sperm ducts\" are blocked.

## 2024-06-10 NOTE — DISCHARGE INSTRUCTIONS
Please let nurse know you need updated code for [employer] access.    You have received ceftriaxone here in urgent care today.  Please take doxycycline as prescribed until its gone even if you are feeling better.  Refrain from intercourse until your treatment is complete.  Hydrate well keeping urine clear/pale yellow or on this medication.    You might receive a call in 2-3 days regarding test results.  You should also have results available on your [employer] [facility] brayan if you gain access.    Please practice safe sex.    If you desire/require additional testing please attend [facility].  [address] [place] [id number].  [phone]    Hope you are feeling better soon!

## 2024-06-10 NOTE — ED PROVIDER NOTES
NEGATIVE mg/dl    Bilirubin, Urine Negative NEGATIVE    Ketones, Urine Negative NEGATIVE    Specific Gravity, UA >=1.030 1.002 - 1.030    Blood, Urine Negative NEGATIVE    pH, Urine 6.00 5.0 - 9.0    Protein, UA Negative NEGATIVE mg/dl    Urobilinogen, Urine 0.20 0.2 - 1.0 eu/dl    Nitrite, Urine Negative NEGATIVE    Leukocyte Esterase, Urine Negative NEGATIVE    Color, UA Yellow STRAW-YELLOW    Character, Urine Clear CLEAR-SL CLOUD     IMAGING:  No orders to display     EKG:  URGENT CARE COURSE:     Vitals:    06/10/24 1132   BP: (!) 144/81   Pulse: 74   Resp: 17   Temp: 98.5 °F (36.9 °C)   TempSrc: Oral   SpO2: 98%     Medications   cefTRIAXone (ROCEPHIN) 500 mg in lidocaine 1 % 1.43 mL IM Injection (has no administration in time range)     PROCEDURES: (None if blank)  Procedures:   FINAL IMPRESSION      1. Dysuria    2. History of chlamydia    3. History of gonorrhea      DISPOSITION/ PLAN   PATIENT REFERRED TO:  No primary care provider on file.  No primary physician on file.  DISCHARGE MEDICATIONS:  New Prescriptions    DOXYCYCLINE HYCLATE (VIBRAMYCIN) 100 MG CAPSULE    Take 1 capsule by mouth 2 times daily for 7 days     Discontinued Medications    No medications on file     Current Discharge Medication List        FE Maciel CNP    (Please note that portions of this note were completed with a voice recognition program. Efforts were made to edit the dictations but occasionally words are mis-transcribed.)           Damian Juan APRN - CNP  06/10/24 [id number]

## 2024-06-11 LAB
CHLAMYDIA DNA UR QL NAA+PROBE: NEGATIVE
CHLAMYDIA, GC DNA AMP, URINE: NORMAL
N GONORRHOEA DNA UR QL NAA+PROBE: NEGATIVE

## 2024-10-23 SDOH — HEALTH STABILITY: PHYSICAL HEALTH: ON AVERAGE, HOW MANY MINUTES DO YOU ENGAGE IN EXERCISE AT THIS LEVEL?: 150+ MIN

## 2024-10-23 SDOH — HEALTH STABILITY: PHYSICAL HEALTH: ON AVERAGE, HOW MANY DAYS PER WEEK DO YOU ENGAGE IN MODERATE TO STRENUOUS EXERCISE (LIKE A BRISK WALK)?: 7 DAYS

## 2024-10-24 ENCOUNTER — OFFICE VISIT (OUTPATIENT)
Dept: FAMILY MEDICINE CLINIC | Age: 24
End: 2024-10-24

## 2024-10-24 VITALS
SYSTOLIC BLOOD PRESSURE: 136 MMHG | TEMPERATURE: 98.6 F | HEIGHT: 71 IN | DIASTOLIC BLOOD PRESSURE: 78 MMHG | BODY MASS INDEX: 25.9 KG/M2 | RESPIRATION RATE: 16 BRPM | HEART RATE: 91 BPM | WEIGHT: 185 LBS | OXYGEN SATURATION: 99 %

## 2024-10-24 DIAGNOSIS — R41.3 MEMORY DEFICIT: ICD-10-CM

## 2024-10-24 DIAGNOSIS — Z00.00 ENCOUNTER FOR MEDICAL EXAMINATION TO ESTABLISH CARE: Primary | ICD-10-CM

## 2024-10-24 SDOH — ECONOMIC STABILITY: INCOME INSECURITY: HOW HARD IS IT FOR YOU TO PAY FOR THE VERY BASICS LIKE FOOD, HOUSING, MEDICAL CARE, AND HEATING?: NOT HARD AT ALL

## 2024-10-24 SDOH — ECONOMIC STABILITY: FOOD INSECURITY: WITHIN THE PAST 12 MONTHS, YOU WORRIED THAT YOUR FOOD WOULD RUN OUT BEFORE YOU GOT MONEY TO BUY MORE.: NEVER TRUE

## 2024-10-24 SDOH — ECONOMIC STABILITY: FOOD INSECURITY: WITHIN THE PAST 12 MONTHS, THE FOOD YOU BOUGHT JUST DIDN'T LAST AND YOU DIDN'T HAVE MONEY TO GET MORE.: NEVER TRUE

## 2024-10-24 ASSESSMENT — PATIENT HEALTH QUESTIONNAIRE - PHQ9
SUM OF ALL RESPONSES TO PHQ QUESTIONS 1-9: 0
1. LITTLE INTEREST OR PLEASURE IN DOING THINGS: NOT AT ALL
2. FEELING DOWN, DEPRESSED OR HOPELESS: NOT AT ALL
SUM OF ALL RESPONSES TO PHQ QUESTIONS 1-9: 0
SUM OF ALL RESPONSES TO PHQ QUESTIONS 1-9: 0
SUM OF ALL RESPONSES TO PHQ9 QUESTIONS 1 & 2: 0
SUM OF ALL RESPONSES TO PHQ QUESTIONS 1-9: 0

## 2024-10-24 ASSESSMENT — ENCOUNTER SYMPTOMS
RESPIRATORY NEGATIVE: 1
GASTROINTESTINAL NEGATIVE: 1

## 2024-10-24 NOTE — PROGRESS NOTES
normal. No respiratory distress.      Breath sounds: Normal breath sounds.   Abdominal:      General: Abdomen is flat. Bowel sounds are normal. There is no distension.      Palpations: Abdomen is soft.      Tenderness: There is no abdominal tenderness.   Musculoskeletal:         General: Normal range of motion.   Skin:     General: Skin is warm and dry.      Capillary Refill: Capillary refill takes less than 2 seconds.   Neurological:      General: No focal deficit present.      Mental Status: He is alert.   Psychiatric:         Attention and Perception: Attention normal.         Mood and Affect: Mood is anxious. Affect is labile.         Speech: Speech normal.         Behavior: Behavior normal. Behavior is cooperative.         Thought Content: Thought content normal. Thought content does not include homicidal or suicidal plan.         Cognition and Memory: Memory is impaired.         Judgment: Judgment normal.          /Plan:     Assessment & Plan     1. Encounter for medical examination to establish care  Discussed MARIA ELENA and condom use with sexual activity     2. Memory deficit  Keep appt with Dr. Diallo for ADHD testing and f/u in office with me to go over results of testing  Pt in agreement with plan       Return in about 5 weeks (around 11/28/2024) for f/u ADHD.    Reccommended tobaccocessation options including pharmacologic methods, counseled great than 3 minutesduring this visit:  Yes[]  No  []       Patient given educational materials -see patient instructions.  Discussed use, benefit, and side effects of prescribedmedications.  All patient questions answered.  Pt voiced understanding. Reviewedhealth maintenance.  Instructed to continue current medications, diet and exercise.Patient agreed with treatment plan. Follow up as directed.       Electronicallysigned by FE Almendarez CNP on 10/24/2024 at 2:26 PM

## 2024-12-19 ENCOUNTER — OFFICE VISIT (OUTPATIENT)
Dept: FAMILY MEDICINE CLINIC | Age: 24
End: 2024-12-19

## 2024-12-19 VITALS
OXYGEN SATURATION: 97 % | HEIGHT: 71 IN | WEIGHT: 189.4 LBS | SYSTOLIC BLOOD PRESSURE: 132 MMHG | TEMPERATURE: 98.3 F | DIASTOLIC BLOOD PRESSURE: 78 MMHG | HEART RATE: 74 BPM | BODY MASS INDEX: 26.52 KG/M2 | RESPIRATION RATE: 16 BRPM

## 2024-12-19 DIAGNOSIS — Z20.2 POSSIBLE EXPOSURE TO STI: ICD-10-CM

## 2024-12-19 DIAGNOSIS — R30.0 DYSURIA: ICD-10-CM

## 2024-12-19 DIAGNOSIS — F90.2 ATTENTION DEFICIT HYPERACTIVITY DISORDER (ADHD), COMBINED TYPE: Primary | ICD-10-CM

## 2024-12-19 DIAGNOSIS — K13.70 ORAL LESION: ICD-10-CM

## 2024-12-19 DIAGNOSIS — Z02.89 MEDICATION MANAGEMENT CONTRACT SIGNED: ICD-10-CM

## 2024-12-19 DIAGNOSIS — Z72.51 UNPROTECTED SEXUAL INTERCOURSE: ICD-10-CM

## 2024-12-19 LAB
AMPHETAMINES UR QL SCN: NEGATIVE
BARBITURATES UR QL SCN: NEGATIVE
BENZODIAZ UR QL SCN: NEGATIVE
BILIRUBIN, URINE: NEGATIVE
BLOOD URINE, POC: NEGATIVE
BZE UR QL SCN: NEGATIVE
CANNABINOIDS UR QL SCN: NEGATIVE
CHARACTER, URINE: CLEAR
CHLAMYDIA, GC DNA AMP, URINE: NORMAL
COLOR, UA: YELLOW
FENTANYL: NEGATIVE
GLUCOSE URINE: NEGATIVE MG/DL
KETONES, URINE: NEGATIVE
LEUKOCYTE CLUMPS, URINE: NEGATIVE
NITRITE, URINE: NEGATIVE
OPIATES UR QL SCN: NEGATIVE
OXYCODONE: NEGATIVE
PCP UR QL SCN: NEGATIVE
PH, URINE: 7 (ref 5–9)
PROTEIN, URINE: NEGATIVE MG/DL
SPECIFIC GRAVITY UA: 1.02 (ref 1–1.03)
UROBILINOGEN, URINE: 0.2 EU/DL (ref 0–1)

## 2024-12-19 RX ORDER — METHYLPHENIDATE HYDROCHLORIDE 18 MG/1
18 TABLET ORAL DAILY
Qty: 30 TABLET | Refills: 0 | Status: SHIPPED | OUTPATIENT
Start: 2024-12-19 | End: 2025-01-18

## 2024-12-19 ASSESSMENT — ENCOUNTER SYMPTOMS
COLOR CHANGE: 1
RESPIRATORY NEGATIVE: 1

## 2024-12-19 NOTE — PROGRESS NOTES
heart sounds. No murmur heard.  Pulmonary:      Effort: Pulmonary effort is normal. No respiratory distress.      Breath sounds: Normal breath sounds. No wheezing.   Skin:     General: Skin is warm and dry.      Capillary Refill: Capillary refill takes less than 2 seconds.   Neurological:      General: No focal deficit present.      Mental Status: He is alert and oriented to person, place, and time.          /Plan:     Assessment & Plan     1. Attention deficit hyperactivity disorder (ADHD), combined type  Send urine out for drug screen  Start concerta 18 mg daily   SE explained to pt   - methylphenidate (CONCERTA) 18 MG extended release tablet; Take 1 tablet by mouth daily for 30 days. Max Daily Amount: 18 mg  Dispense: 30 tablet; Refill: 0  - Urine Drug Screen    2. Unprotected sexual intercourse    Use condoms to prevent UTI  S  Screen for GC/chlamydia,Trichomonas HIV hep C and RPR  3. Dysuria  Unclear etiology  R/o STI  -#2  U/a today negative for UTI    4. Possible exposure to STI  -#3  - Culture, Gonococcus  - C.trachomatis N.gonorrhoeae DNA; Future  - Trichomonas Vaginalis by SHE[place]; Future  - POCT Urinalysis No Micro (Auto) - leukocytes, - nitrites -proteinuria  - HIV Screen; Future  - Hepatitis C Antibody; Future  - RPR; Future    5. Oral lesion  Warm salt water gargles  May use topical anbesol   Lesion not indicative of herpetic lesion or abscess, no need for atb     6. Medication management contract signed    - Urine Drug Screen    Pt in agreement with plan  Controlled substances monitoring: possible medication side effects, risk of tolerance and/or dependence, and alternative treatments discussed, no signs of potential drug abuse or diversion identified, [place]AR[place] report reviewed today- activity consistent with treatment plan, medication contract signed today, and random urine drug screen sent today.   Return in about 4 weeks (around 1/16/2025) for f/u ADHD.    Reccommended tobaccocessation options including

## 2024-12-20 ENCOUNTER — TELEPHONE (OUTPATIENT)
Dept: FAMILY MEDICINE CLINIC | Age: 24
End: 2024-12-20

## 2024-12-24 ENCOUNTER — TELEPHONE (OUTPATIENT)
Dept: FAMILY MEDICINE CLINIC | Age: 24
End: 2024-12-24

## 2024-12-24 NOTE — TELEPHONE ENCOUNTER
Called patient and informed of message.  Patient verbalized understanding.  No questions or concerns at this time.

## 2024-12-24 NOTE — TELEPHONE ENCOUNTER
----- Message from FE Almendarez - CNP sent at 12/24/2024  9:54 AM EST -----  Let pt know his gonorrhea and chlamydia testing is negative

## 2025-01-16 ENCOUNTER — OFFICE VISIT (OUTPATIENT)
Dept: FAMILY MEDICINE CLINIC | Age: 25
End: 2025-01-16

## 2025-01-16 VITALS
BODY MASS INDEX: 25.48 KG/M2 | HEART RATE: 77 BPM | OXYGEN SATURATION: 99 % | TEMPERATURE: 97.6 F | HEIGHT: 71 IN | SYSTOLIC BLOOD PRESSURE: 128 MMHG | RESPIRATION RATE: 16 BRPM | DIASTOLIC BLOOD PRESSURE: 82 MMHG | WEIGHT: 182 LBS

## 2025-01-16 DIAGNOSIS — Z02.89 MEDICATION MANAGEMENT CONTRACT SIGNED: ICD-10-CM

## 2025-01-16 DIAGNOSIS — F90.2 ATTENTION DEFICIT HYPERACTIVITY DISORDER (ADHD), COMBINED TYPE: Primary | ICD-10-CM

## 2025-01-16 LAB
AMPHETAMINES UR QL SCN: NEGATIVE
BARBITURATES UR QL SCN: NEGATIVE
BENZODIAZ UR QL SCN: NEGATIVE
BZE UR QL SCN: NEGATIVE
CANNABINOIDS UR QL SCN: NEGATIVE
FENTANYL: NEGATIVE
OPIATES UR QL SCN: NEGATIVE
OXYCODONE: POSITIVE
PCP UR QL SCN: NEGATIVE

## 2025-01-16 RX ORDER — METHYLPHENIDATE HYDROCHLORIDE 36 MG/1
36 TABLET ORAL DAILY
Qty: 30 TABLET | Refills: 0 | Status: SHIPPED | OUTPATIENT
Start: 2025-03-14 | End: 2025-04-13

## 2025-01-16 RX ORDER — METHYLPHENIDATE HYDROCHLORIDE 36 MG/1
36 TABLET ORAL DAILY
Qty: 30 TABLET | Refills: 0 | Status: SHIPPED | OUTPATIENT
Start: 2025-01-16 | End: 2025-02-15

## 2025-01-16 RX ORDER — METHYLPHENIDATE HYDROCHLORIDE 36 MG/1
36 TABLET ORAL DAILY
Qty: 30 TABLET | Refills: 0 | Status: SHIPPED | OUTPATIENT
Start: 2025-02-14 | End: 2025-03-16

## 2025-01-16 SDOH — ECONOMIC STABILITY: INCOME INSECURITY: IN THE LAST 12 MONTHS, WAS THERE A TIME WHEN YOU WERE NOT ABLE TO PAY THE MORTGAGE OR RENT ON TIME?: YES

## 2025-01-16 SDOH — ECONOMIC STABILITY: FOOD INSECURITY: WITHIN THE PAST 12 MONTHS, THE FOOD YOU BOUGHT JUST DIDN'T LAST AND YOU DIDN'T HAVE MONEY TO GET MORE.: SOMETIMES TRUE

## 2025-01-16 SDOH — ECONOMIC STABILITY: TRANSPORTATION INSECURITY
IN THE PAST 12 MONTHS, HAS LACK OF TRANSPORTATION KEPT YOU FROM MEETINGS, WORK, OR FROM GETTING THINGS NEEDED FOR DAILY LIVING?: NO

## 2025-01-16 SDOH — ECONOMIC STABILITY: FOOD INSECURITY: WITHIN THE PAST 12 MONTHS, YOU WORRIED THAT YOUR FOOD WOULD RUN OUT BEFORE YOU GOT MONEY TO BUY MORE.: SOMETIMES TRUE

## 2025-01-16 SDOH — ECONOMIC STABILITY: TRANSPORTATION INSECURITY
IN THE PAST 12 MONTHS, HAS THE LACK OF TRANSPORTATION KEPT YOU FROM MEDICAL APPOINTMENTS OR FROM GETTING MEDICATIONS?: NO

## 2025-01-16 ASSESSMENT — PATIENT HEALTH QUESTIONNAIRE - PHQ9
1. LITTLE INTEREST OR PLEASURE IN DOING THINGS: NOT AT ALL
SUM OF ALL RESPONSES TO PHQ QUESTIONS 1-9: 0
2. FEELING DOWN, DEPRESSED OR HOPELESS: NOT AT ALL
SUM OF ALL RESPONSES TO PHQ QUESTIONS 1-9: 0
SUM OF ALL RESPONSES TO PHQ9 QUESTIONS 1 & 2: 0
2. FEELING DOWN, DEPRESSED OR HOPELESS: NOT AT ALL
1. LITTLE INTEREST OR PLEASURE IN DOING THINGS: NOT AT ALL
SUM OF ALL RESPONSES TO PHQ9 QUESTIONS 1 & 2: 0

## 2025-01-16 ASSESSMENT — ENCOUNTER SYMPTOMS: RESPIRATORY NEGATIVE: 1

## 2025-01-16 NOTE — PROGRESS NOTES
[facility]  [address]  [place] [address]  Dept: [phone]  Dept Fax: [phone]  Loc: [phone]    Matias Geiger is a 24 y.o. malewho presents today for his medical conditions/complaints as noted below.  Matias Geigeris c/o of Follow-up (ADHD follow up wants to discuss medication dosage increase )      :     HPI    Pt here to discuss ADHD, and a few other concerns      Has been seen by Dr. Craig and Dx with ADHD, results of visit and Dx scanned into chart       Has not had meds in 3 days did not think it was working   States the first 2 weeks he noticed an increase in concentration but then it wore of and he was just having the SE of dry mouth    Continue with Dr Craig who thinks he needs an increase in meds     Matias Geiger is a 24 y.o. male that presents for evaluation of   Chief Complaint   Patient presents with    Follow-up     Wants to discuss ADHD and a sore on right side of mouth, wants checked for STDS         HPI:    Recent Performance in School - pt not in school, does work     Hyper active symptoms (Fidgeting, restlessness, excessive talking)?  yes: fidgets at times gets nervous , can't sit still talks a lot when nervous   Disruptive symptoms?  no  Difficulties with attention?  yes  Behavioral problems?  no  Difficulties with social relationships?  yes  Mood Symptoms?  yes: some irritability at times   Difficulty with sleep?  no  Changes in appetite?  no    Symptoms present prior to age 7?  no  Symptoms present at home?  yes  Symptoms present at school/?  Symptom present at work   Has patient met developmental milestones?  Yes       Pt started on concerta 18 gm here for f/u   Does not think it is working well         REVIEW OF SYSTEMS:    Current Outpatient Medications   Medication Sig Dispense Refill    methylphenidate (CONCERTA) 36 MG extended release tablet Take 1 tablet by mouth daily for 30 days. Max Daily Amount: 36 mg

## 2025-01-16 NOTE — PATIENT INSTRUCTIONS
[email or web address]

## 2025-01-17 ENCOUNTER — CARE COORDINATION (OUTPATIENT)
Dept: CARE COORDINATION | Age: 25
End: 2025-01-17

## 2025-01-17 NOTE — CARE COORDINATION
SW mailed out local resources to pt to assist with his food insecurity. SW included contact information requesting a call back to discuss any additional needs.

## 2025-02-17 DIAGNOSIS — F90.2 ATTENTION DEFICIT HYPERACTIVITY DISORDER (ADHD), COMBINED TYPE: ICD-10-CM

## 2025-02-17 RX ORDER — METHYLPHENIDATE HYDROCHLORIDE 36 MG/1
36 TABLET ORAL DAILY
Qty: 30 TABLET | Refills: 0 | Status: CANCELLED | OUTPATIENT
Start: 2025-02-17 | End: 2025-03-19

## 2025-02-17 NOTE — TELEPHONE ENCOUNTER
Let pt know the pharmacy did have his concerta script already but due to it being a controlled substance he can not request refills for the concerta  online he needs to phone the pharmacy for the refills.